# Patient Record
Sex: MALE | Race: BLACK OR AFRICAN AMERICAN | NOT HISPANIC OR LATINO | URBAN - METROPOLITAN AREA
[De-identification: names, ages, dates, MRNs, and addresses within clinical notes are randomized per-mention and may not be internally consistent; named-entity substitution may affect disease eponyms.]

---

## 2017-12-17 ENCOUNTER — EMERGENCY (EMERGENCY)
Facility: HOSPITAL | Age: 22
LOS: 1 days | Discharge: HOME | End: 2017-12-17
Attending: EMERGENCY MEDICINE | Admitting: EMERGENCY MEDICINE
Payer: OTHER GOVERNMENT

## 2017-12-17 VITALS
OXYGEN SATURATION: 100 % | RESPIRATION RATE: 14 BRPM | TEMPERATURE: 98 F | SYSTOLIC BLOOD PRESSURE: 124 MMHG | HEART RATE: 76 BPM | DIASTOLIC BLOOD PRESSURE: 67 MMHG

## 2017-12-17 VITALS
TEMPERATURE: 98 F | OXYGEN SATURATION: 100 % | HEART RATE: 118 BPM | RESPIRATION RATE: 20 BRPM | SYSTOLIC BLOOD PRESSURE: 127 MMHG | DIASTOLIC BLOOD PRESSURE: 52 MMHG

## 2017-12-17 PROBLEM — Z00.00 ENCOUNTER FOR PREVENTIVE HEALTH EXAMINATION: Status: ACTIVE | Noted: 2017-12-17

## 2017-12-17 PROCEDURE — 71020: CPT | Mod: 26

## 2017-12-17 PROCEDURE — 99284 EMERGENCY DEPT VISIT MOD MDM: CPT

## 2017-12-17 RX ORDER — SODIUM CHLORIDE 9 MG/ML
1000 INJECTION INTRAMUSCULAR; INTRAVENOUS; SUBCUTANEOUS ONCE
Qty: 0 | Refills: 0 | Status: COMPLETED | OUTPATIENT
Start: 2017-12-17 | End: 2017-12-17

## 2017-12-17 RX ADMIN — SODIUM CHLORIDE 1000 MILLILITER(S): 9 INJECTION INTRAMUSCULAR; INTRAVENOUS; SUBCUTANEOUS at 15:25

## 2017-12-17 NOTE — ED PROVIDER NOTE - OBJECTIVE STATEMENT
23y/o M with no pertinent PMHx presents to the ED c/o rhinorrhea, congestion, and HA x1mo with blood in mucous, epistaxis and hemoptysis beginning yesterday. He blew his nose this morning and bright red blood came out of his L nare. He reports the heat has recently been on very high in his room. Denies fever/chills, vomiting, any other complaints. Allergic to penicillin. 23y/o M with no pertinent PMHx presents to the ED c/o rhinorrhea, congestion, and HA x1mo with blood in mucous, mild epistaxis beginning yesterday. He blew his nose this morning and bright red blood came out of his L nare. He reports the heat has recently been on very high in his room. Denies fever/chills, vomiting, any other complaints. Allergic to penicillin.

## 2017-12-17 NOTE — ED PROVIDER NOTE - PROGRESS NOTE DETAILS
ИВАН Ledbetter: Pt signed out to me by shruti KUMAR. Plan to give IVF, xray and repeat VS. Xray showing no acute findings. VS improved after ivf, pt feeling better. Pt stable for discharge.

## 2017-12-17 NOTE — ED ADULT NURSE NOTE - OBJECTIVE STATEMENT
21 y/o male presents to ED with c/o cough, nasal congestion, generally not feeling well x 1 month.  Pt denies n/v/d, no fever chills.  Pt awake alert in NAD, speaking full sentences, ambulatory with steady gait.  IV established as per order IVF infusing well, will cont to monitor

## 2017-12-17 NOTE — ED PROVIDER NOTE - MEDICAL DECISION MAKING DETAILS
23y/o M with cough x1mo with report of blood mixed into mucous. Likely bronchitis vs. PNA. Doubt malignancy given age and non-smoker. Doubt TB as no recent foreign travel. Will give CXR, give 1L IV fluids and repeat vitals. Anticipate DC home with PCP f/u. 21y/o M with cough x1mo with report of blood mixed into mucous. Likely bronchitis vs. PNA. Doubt malignancy given age and non-smoker. Doubt TB as no recent foreign travel. Will obtain CXR, give 1L IV fluids and repeat vitals. Anticipate DC home with PCP f/u.

## 2023-08-02 NOTE — ED ADULT NURSE NOTE - NS PRO AD NO ADVANCE DIRECTIVE
[Medication Risks Reviewed] : Medication risks reviewed [de-identified] : lidoderm patches as needed\par  HEP\par  Ice.\par  mod activity No

## 2024-07-02 ENCOUNTER — EMERGENCY (EMERGENCY)
Facility: HOSPITAL | Age: 29
LOS: 1 days | Discharge: HOME | End: 2024-07-02
Attending: STUDENT IN AN ORGANIZED HEALTH CARE EDUCATION/TRAINING PROGRAM | Admitting: STUDENT IN AN ORGANIZED HEALTH CARE EDUCATION/TRAINING PROGRAM

## 2024-07-02 VITALS
DIASTOLIC BLOOD PRESSURE: 76 MMHG | RESPIRATION RATE: 16 BRPM | HEART RATE: 80 BPM | OXYGEN SATURATION: 99 % | SYSTOLIC BLOOD PRESSURE: 121 MMHG | TEMPERATURE: 98 F

## 2024-07-02 PROCEDURE — 93010 ELECTROCARDIOGRAM REPORT: CPT

## 2024-07-02 PROCEDURE — 99284 EMERGENCY DEPT VISIT MOD MDM: CPT

## 2024-07-02 RX ORDER — DEXAMETHASONE 1 MG/1
10 TABLET ORAL ONCE
Refills: 0 | Status: COMPLETED | OUTPATIENT
Start: 2024-07-02 | End: 2024-07-02

## 2024-07-02 RX ADMIN — DEXAMETHASONE 10 MILLIGRAM(S): 1 TABLET ORAL at 21:22

## 2024-07-03 LAB — S PYO DNA THROAT QL NAA+PROBE: ABNORMAL

## 2024-07-04 RX ORDER — CLINDAMYCIN PHOSPHATE 150 MG/ML
1 INJECTION, SOLUTION INTRAVENOUS
Qty: 30 | Refills: 0
Start: 2024-07-04 | End: 2024-07-13